# Patient Record
Sex: MALE | Race: WHITE | NOT HISPANIC OR LATINO | Employment: OTHER | URBAN - METROPOLITAN AREA
[De-identification: names, ages, dates, MRNs, and addresses within clinical notes are randomized per-mention and may not be internally consistent; named-entity substitution may affect disease eponyms.]

---

## 2023-09-27 RX ORDER — LORATADINE 10 MG/1
1 TABLET ORAL DAILY
COMMUNITY
Start: 2018-03-30

## 2023-09-27 RX ORDER — FLUTICASONE PROPIONATE 50 MCG
SPRAY, SUSPENSION (ML) NASAL
COMMUNITY
Start: 2018-03-30

## 2023-09-27 RX ORDER — ONDANSETRON 4 MG/1
TABLET, ORALLY DISINTEGRATING ORAL
COMMUNITY

## 2023-09-27 RX ORDER — FLUOXETINE HYDROCHLORIDE 20 MG/1
20 CAPSULE ORAL
COMMUNITY
Start: 2023-03-30

## 2023-09-27 RX ORDER — PREGABALIN 150 MG/1
1 CAPSULE ORAL 2 TIMES DAILY
COMMUNITY
Start: 2017-09-13

## 2023-09-27 RX ORDER — AMITRIPTYLINE HYDROCHLORIDE 75 MG/1
1 TABLET ORAL NIGHTLY
COMMUNITY
Start: 2016-11-03

## 2023-09-27 RX ORDER — OLANZAPINE 2.5 MG/1
1 TABLET ORAL NIGHTLY
COMMUNITY
Start: 2023-03-30

## 2023-09-27 RX ORDER — GABAPENTIN 100 MG/1
200 CAPSULE ORAL 2 TIMES DAILY PRN
COMMUNITY
Start: 2023-06-07

## 2023-09-27 RX ORDER — BUDESONIDE AND FORMOTEROL FUMARATE DIHYDRATE 160; 4.5 UG/1; UG/1
2 AEROSOL RESPIRATORY (INHALATION) 2 TIMES DAILY
COMMUNITY
Start: 2018-03-30

## 2024-08-14 ENCOUNTER — APPOINTMENT (OUTPATIENT)
Dept: RADIOLOGY | Facility: HOSPITAL | Age: 34
End: 2024-08-14
Payer: COMMERCIAL

## 2024-08-21 ENCOUNTER — APPOINTMENT (OUTPATIENT)
Dept: RADIOLOGY | Facility: HOSPITAL | Age: 34
End: 2024-08-21
Payer: COMMERCIAL

## 2024-11-29 ENCOUNTER — OFFICE VISIT (OUTPATIENT)
Dept: URGENT CARE | Age: 34
End: 2024-11-29

## 2024-11-29 ENCOUNTER — ANCILLARY PROCEDURE (OUTPATIENT)
Dept: URGENT CARE | Age: 34
End: 2024-11-29
Payer: COMMERCIAL

## 2024-11-29 VITALS
BODY MASS INDEX: 22.5 KG/M2 | SYSTOLIC BLOOD PRESSURE: 119 MMHG | OXYGEN SATURATION: 95 % | WEIGHT: 140 LBS | DIASTOLIC BLOOD PRESSURE: 82 MMHG | HEART RATE: 80 BPM | HEIGHT: 66 IN | TEMPERATURE: 98.8 F

## 2024-11-29 DIAGNOSIS — R05.1 ACUTE COUGH: ICD-10-CM

## 2024-11-29 DIAGNOSIS — R05.1 ACUTE COUGH: Primary | ICD-10-CM

## 2024-11-29 DIAGNOSIS — J18.9 PNEUMONIA OF LEFT LUNG DUE TO INFECTIOUS ORGANISM, UNSPECIFIED PART OF LUNG: ICD-10-CM

## 2024-11-29 PROCEDURE — 71046 X-RAY EXAM CHEST 2 VIEWS: CPT | Performed by: NURSE PRACTITIONER

## 2024-11-29 RX ORDER — AMOXICILLIN AND CLAVULANATE POTASSIUM 875; 125 MG/1; MG/1
1 TABLET, FILM COATED ORAL 2 TIMES DAILY
Qty: 10 TABLET | Refills: 0 | Status: SHIPPED | OUTPATIENT
Start: 2024-11-29 | End: 2024-12-04

## 2024-11-29 RX ORDER — AZITHROMYCIN 250 MG/1
TABLET, FILM COATED ORAL
Qty: 6 TABLET | Refills: 0 | Status: SHIPPED | OUTPATIENT
Start: 2024-11-29 | End: 2024-12-04

## 2024-11-29 RX ORDER — ALBUTEROL SULFATE 90 UG/1
2 INHALANT RESPIRATORY (INHALATION) EVERY 6 HOURS PRN
Qty: 18 G | Refills: 0 | Status: SHIPPED | OUTPATIENT
Start: 2024-11-29 | End: 2025-11-29

## 2024-11-29 RX ORDER — BENZONATATE 100 MG/1
100 CAPSULE ORAL 3 TIMES DAILY PRN
Qty: 20 CAPSULE | Refills: 0 | Status: SHIPPED | OUTPATIENT
Start: 2024-11-29 | End: 2024-12-06

## 2024-11-29 ASSESSMENT — ENCOUNTER SYMPTOMS
FATIGUE: 1
WHEEZING: 1
COUGH: 1
FEVER: 1
CHEST TIGHTNESS: 1

## 2024-11-29 ASSESSMENT — PAIN SCALES - GENERAL: PAINLEVEL_OUTOF10: 0-NO PAIN

## 2024-11-29 NOTE — PATIENT INSTRUCTIONS
Augmentin two times a day for 5 days  Zpack as directed  As needed albuterol  As needed tessalon perles  OTC probiotic  If worsening, please go to ER    Please follow up with your primary provider within one week if symptoms do not improve.  You may schedule an appointment online at [email or web address] or call [phone]. Go to the Emergency Department if symptoms significantly worsen or if you develop chest pain or shortness of breath.

## 2024-11-29 NOTE — PROGRESS NOTES
"Subjective   Patient ID: Negro Schafer is a 34 y.o. male. They present today with a chief complaint of Cough (Patient c/o cough, fever over 2 weeks. ).    History of Present Illness  Negro Schafer is a 34 y.o. male who presents to the clinic for 2 weeks of cough, fever, chest tightness, fatigue.  Pt denies any chest pain, sob, N/V at this time in clinic.             Past Medical History  Allergies as of 11/29/2024 - Reviewed 11/29/2024   Allergen Reaction Noted    Doxycycline Rash, Other, and Unknown 09/30/2024       (Not in a hospital admission)       Past Medical History:   Diagnosis Date    Carpal tunnel syndrome, bilateral upper limbs 10/08/2015    Bilateral carpal tunnel syndrome    Lesion of ulnar nerve, unspecified upper limb 10/08/2015    Cubital canal compression    Personal history of other diseases of the nervous system and sense organs 09/24/2015    History of carpal tunnel syndrome       Past Surgical History:   Procedure Laterality Date    UMBILICAL HERNIA REPAIR  12/21/2015    Umbilical Hernia Repair            Review of Systems  Review of Systems   Constitutional:  Positive for fatigue and fever.   HENT:  Positive for congestion.    Respiratory:  Positive for cough, chest tightness and wheezing.    All other systems reviewed and are negative.                                 Objective    Vitals:    11/29/24 0943   BP: 119/82   BP Location: Left arm   Patient Position: Sitting   Pulse: 80   Temp: 37.1 °C (98.8 °F)   SpO2: 95%   Weight: 63.5 kg (140 lb)   Height: 1.676 m (5' 6\")     No LMP for male patient.    Physical Exam  Constitutional:       Appearance: He is ill-appearing.   HENT:      Nose:      Right Sinus: Maxillary sinus tenderness present. No frontal sinus tenderness.      Left Sinus: Maxillary sinus tenderness present. No frontal sinus tenderness.      Mouth/Throat:      Pharynx: Posterior oropharyngeal erythema and postnasal drip present.      Tonsils: 1+ on the right. 1+ on the left. "   Cardiovascular:      Rate and Rhythm: Normal rate and regular rhythm.   Pulmonary:      Breath sounds: Wheezing and rhonchi present.      Comments: RLL,LLL- rhonchi  Upper lobes- wheezing  Neurological:      Mental Status: He is alert.         Procedures    Point of Care Test & Imaging Results from this visit  No results found for this visit on 11/29/24.   XR chest 2 views    Result Date: 11/29/2024  Interpreted By:  Malcolm Rojas, STUDY: XR CHEST 2 VIEWS   INDICATION: Signs/Symptoms:cough.   COMPARISON: None   ACCESSION NUMBER(S): [id number]   ORDERING CLINICIAN: SVEN LANGFORD   FINDINGS: Left lower lobe consolidation. No effusion or pneumothorax seen. Heart size within normal limits.       Left lower lobe pneumonia.   Signed by: Malcolm Rojas 11/29/2024 11:14 AM Dictation workstation:   [id number]     Diagnostic study results (if any) were reviewed by CHILO Fallon-CNP.    Assessment/Plan   Allergies, medications, history, and pertinent labs/EKGs/Imaging reviewed by CHILO Fallon-CNP.     Medical Decision Making  Signs, symptoms, examination, and my interpretation of XRAY imaging consistent with community acquired pneumonia. No evidence of sepsis or acute respiratory distress at this time. Will treat with appropriate antibiotics for age group/risk factors. Patient is advised if symptoms change or worsen go to ED for further evaluation and care. Otherwise follow-up with family doctor for recheck within 5-7 days repeat xray in 10-14 days    Orders and Diagnoses  Diagnoses and all orders for this visit:  Acute cough  -     XR chest 2 views; Future  Pneumonia of left lung due to infectious organism, unspecified part of lung  -     azithromycin (Zithromax) 250 mg tablet; Take 2 tabs (500 mg) by mouth today, than 1 daily for 4 days.  -     amoxicillin-pot clavulanate (Augmentin) 875-125 mg tablet; Take 1 tablet by mouth 2 times a day for 5 days.  -     benzonatate (Tessalon) 100 mg capsule; Take 1  capsule (100 mg) by mouth 3 times a day as needed for cough for up to 7 days. Do not crush or chew.  -     albuterol 90 mcg/actuation inhaler; Inhale 2 puffs every 6 hours if needed for wheezing.      Medical Admin Record      Patient disposition: Home    Electronically signed by CHILO Fallon-DAVE  11:22 AM